# Patient Record
Sex: FEMALE
[De-identification: names, ages, dates, MRNs, and addresses within clinical notes are randomized per-mention and may not be internally consistent; named-entity substitution may affect disease eponyms.]

---

## 2019-02-25 ENCOUNTER — APPOINTMENT (OUTPATIENT)
Dept: OTOLARYNGOLOGY | Facility: CLINIC | Age: 59
End: 2019-02-25
Payer: COMMERCIAL

## 2019-02-25 VITALS — HEART RATE: 74 BPM | SYSTOLIC BLOOD PRESSURE: 115 MMHG | DIASTOLIC BLOOD PRESSURE: 77 MMHG

## 2019-02-25 PROCEDURE — 99203 OFFICE O/P NEW LOW 30 MIN: CPT | Mod: 25

## 2019-02-25 PROCEDURE — 92557 COMPREHENSIVE HEARING TEST: CPT

## 2019-02-25 PROCEDURE — 92567 TYMPANOMETRY: CPT

## 2019-02-25 RX ORDER — BUPROPION HYDROCHLORIDE 100 MG/1
TABLET, FILM COATED ORAL
Refills: 0 | Status: ACTIVE | COMMUNITY

## 2019-02-25 RX ORDER — LEVOTHYROXINE SODIUM 150 UG/1
150 TABLET ORAL
Refills: 0 | Status: ACTIVE | COMMUNITY

## 2019-02-25 RX ORDER — LIOTHYRONINE SODIUM 50 UG/1
TABLET ORAL
Refills: 0 | Status: ACTIVE | COMMUNITY

## 2019-02-25 NOTE — HISTORY OF PRESENT ILLNESS
[de-identified] : Bilateral tinnitus x 6 months - worsening hearing gradual - no vertigo - father has hearing aids starting age 80.

## 2019-09-16 ENCOUNTER — APPOINTMENT (OUTPATIENT)
Dept: OTOLARYNGOLOGY | Facility: CLINIC | Age: 59
End: 2019-09-16

## 2021-04-12 ENCOUNTER — APPOINTMENT (OUTPATIENT)
Dept: OTOLARYNGOLOGY | Facility: CLINIC | Age: 61
End: 2021-04-12
Payer: COMMERCIAL

## 2021-04-12 DIAGNOSIS — H93.13 TINNITUS, BILATERAL: ICD-10-CM

## 2021-04-12 PROCEDURE — 99072 ADDL SUPL MATRL&STAF TM PHE: CPT

## 2021-04-12 PROCEDURE — 92557 COMPREHENSIVE HEARING TEST: CPT

## 2021-04-12 PROCEDURE — 99213 OFFICE O/P EST LOW 20 MIN: CPT | Mod: 25

## 2021-04-12 PROCEDURE — 92567 TYMPANOMETRY: CPT

## 2021-04-12 RX ORDER — FLUTICASONE PROPIONATE 50 UG/1
50 SPRAY, METERED NASAL
Qty: 3 | Refills: 0 | Status: DISCONTINUED | COMMUNITY
Start: 2019-02-25 | End: 2021-04-12

## 2021-04-12 RX ORDER — ALPRAZOLAM 0.5 MG/1
0.5 TABLET ORAL
Qty: 60 | Refills: 0 | Status: DISCONTINUED | COMMUNITY
Start: 2021-03-19

## 2021-04-24 PROBLEM — H93.13 BILATERAL TINNITUS: Status: ACTIVE | Noted: 2019-02-25

## 2021-04-24 NOTE — HISTORY OF PRESENT ILLNESS
[de-identified] : 60F f/u for bilateral symmetric SNHL and tinnitus- pt noticing more hearing loss since last visit- feels masks are making it difficult to hear. Tinnitus is unchanged- notes issue with background noise and having to turn the TV on louder.  Pt denies otalgia, otorrhea, ear infections, tinnitus, dizziness, vertigo or headaches related to hearing.

## 2021-04-24 NOTE — DATA REVIEWED
[de-identified] : Bilateral -mild to moderate sensorineural hearing loss\par Impedance testing reveals normal Type A tympanograms bilaterally\par

## 2023-05-04 ENCOUNTER — APPOINTMENT (OUTPATIENT)
Dept: OTOLARYNGOLOGY | Facility: CLINIC | Age: 63
End: 2023-05-04
Payer: COMMERCIAL

## 2023-05-04 DIAGNOSIS — H90.3 SENSORINEURAL HEARING LOSS, BILATERAL: ICD-10-CM

## 2023-05-04 PROCEDURE — 99213 OFFICE O/P EST LOW 20 MIN: CPT

## 2023-05-04 PROCEDURE — 92557 COMPREHENSIVE HEARING TEST: CPT

## 2023-05-04 PROCEDURE — 92567 TYMPANOMETRY: CPT

## 2023-05-08 PROBLEM — H90.3 COCHLEAR HEARING LOSS, BILATERAL: Status: ACTIVE | Noted: 2019-02-25

## 2023-05-08 NOTE — DATA REVIEWED
[de-identified] : AD: Hearing WNL .25-1kHz sloping to a mild to moderately-severe SNHL 2-8kHz w/ excellent WRS and type Ad tymp.\par AS: Hearing essentially WNL .25-1.5kHz sloping to a mild to moderately-severe SNHL 2-8kHz w/ excellent WRS and type A tymp.

## 2023-05-08 NOTE — HISTORY OF PRESENT ILLNESS
[de-identified] : 61 yo F with progressive b/l SNHL c/w presbycusis - cleared for hearing aids at last visit. Did not get hearing aids. Reports worsening of hearing in the last year. Intermittent tinnitus AU. No otalgia, otorrhea, ear infections, dizziness or headaches related to hearing.

## 2023-08-10 ENCOUNTER — APPOINTMENT (OUTPATIENT)
Dept: PHARMACY | Facility: CLINIC | Age: 63
End: 2023-08-10
Payer: SELF-PAY

## 2023-08-10 PROCEDURE — V5011: CPT

## 2023-08-10 PROCEDURE — V5014B: CUSTOM

## 2023-08-10 PROCEDURE — V5093: CPT

## 2023-08-10 NOTE — HISTORY OF PRESENT ILLNESS
[FreeTextEntry1] : Patient is a 63 year old female with known bilateral SNHL AU, pt is being followed by Dr. Aranda and has been medically cleared for hearing aids. Most recent hearing evaluation reveals normal hearing sloping to severe SNHL, AU with excellent SRS, AU. Pt reports that she has had difficulty hearing for a few years but recently has found her hearing loss interfering with her daily life including while watching TV, having conversations and going out to resturants.

## 2023-08-31 ENCOUNTER — APPOINTMENT (OUTPATIENT)
Dept: PHARMACY | Facility: CLINIC | Age: 63
End: 2023-08-31
Payer: SELF-PAY

## 2023-08-31 PROCEDURE — V5299J: CUSTOM

## 2025-08-21 ENCOUNTER — APPOINTMENT (OUTPATIENT)
Dept: OTOLARYNGOLOGY | Facility: CLINIC | Age: 65
End: 2025-08-21
Payer: COMMERCIAL

## 2025-08-21 ENCOUNTER — APPOINTMENT (OUTPATIENT)
Dept: PHARMACY | Facility: CLINIC | Age: 65
End: 2025-08-21

## 2025-08-21 DIAGNOSIS — H90.3 SENSORINEURAL HEARING LOSS, BILATERAL: ICD-10-CM

## 2025-08-21 DIAGNOSIS — H93.293 OTHER ABNORMAL AUDITORY PERCEPTIONS, BILATERAL: ICD-10-CM

## 2025-08-21 PROCEDURE — 92557 COMPREHENSIVE HEARING TEST: CPT

## 2025-08-21 PROCEDURE — 99213 OFFICE O/P EST LOW 20 MIN: CPT

## 2025-08-21 PROCEDURE — 92567 TYMPANOMETRY: CPT

## 2025-09-09 ENCOUNTER — APPOINTMENT (OUTPATIENT)
Dept: PHARMACY | Facility: CLINIC | Age: 65
End: 2025-09-09
Payer: SELF-PAY

## 2025-09-09 PROCEDURE — V5299J: CUSTOM
